# Patient Record
Sex: FEMALE | NOT HISPANIC OR LATINO | URBAN - METROPOLITAN AREA
[De-identification: names, ages, dates, MRNs, and addresses within clinical notes are randomized per-mention and may not be internally consistent; named-entity substitution may affect disease eponyms.]

---

## 2019-02-25 ENCOUNTER — IMPORTED ENCOUNTER (OUTPATIENT)
Dept: URBAN - METROPOLITAN AREA CLINIC 1 | Facility: CLINIC | Age: 64
End: 2019-02-25

## 2019-02-25 PROBLEM — H40.013: Noted: 2019-02-25

## 2019-02-25 PROBLEM — H25.813: Noted: 2019-02-25

## 2019-02-25 PROBLEM — H04.123: Noted: 2019-02-25

## 2019-02-25 PROBLEM — H40.023: Noted: 2019-02-25

## 2019-02-25 PROBLEM — R73.09: Noted: 2019-02-25

## 2019-02-25 PROCEDURE — 92250 FUNDUS PHOTOGRAPHY W/I&R: CPT

## 2019-02-25 PROCEDURE — 92004 COMPRE OPH EXAM NEW PT 1/>: CPT

## 2019-02-25 NOTE — PATIENT DISCUSSION
1.  DM Type II (Diet Controlled) without sign of diabetic retinopathy and no blot heme on dilated retinal examination today OU No Macular Edema:  Discussed the pathophysiology of diabetes and its effect on the eye and risk of blindness. Stressed the importance of strong glucose control. Advised of importance of at least yearly dilated examinations but to contact us immediately for any problems or concerns. 2. Glaucoma Suspect OU (CD:035/0.40) - IOP: 21 OU. Baseline photos show CD Asymmetry. Patient is considered Low Risk. Condition was discussed with patient and patient understands. Will continue to monitor patient for any progression in condition. Patient was advised to call us with any problems questions or concerns. 3.  Dry Eyes OU -The use/continuation of artificial tears were recommended. 4.  Cataract OU: Observe for now without intervention. The patient was advised to contact us if any change or worsening of visionReturn for an appointment in 1 month for a 10 OCT 24-2 VF with Dr. Kris Matamoros. Return for an appointment in PRN for a 40 with Dr. Kris Matamoros.

## 2019-03-20 ENCOUNTER — IMPORTED ENCOUNTER (OUTPATIENT)
Dept: URBAN - METROPOLITAN AREA CLINIC 1 | Facility: CLINIC | Age: 64
End: 2019-03-20

## 2019-03-20 PROBLEM — H52.4: Noted: 2019-03-20

## 2019-03-20 PROBLEM — H52.13: Noted: 2019-03-20

## 2019-03-20 PROCEDURE — S0621 ROUTINE OPHTHALMOLOGICAL EXA: HCPCS

## 2019-03-20 NOTE — PATIENT DISCUSSION
1. Myopia: Rx was given for correction if indicated and requested. 2. Presbyopia: Rx was given for corrective spectacles if indicated. 3.  Dry Eyes OU -The use/continuation of artificial tears were recommended. 4.  Cataract OU: Observe for now without intervention. The patient was advised to contact us if any change or worsening of vision5. Glaucoma Suspect OU (CD:035/0.40) - IOP: 21 OU. Patient returning for baseline testing. 6.  H/o DM Type II (Diet Controlled)Return for an appointment in 1 year for a 36 with Dr. Gabriela Dwyer.

## 2019-03-25 ENCOUNTER — IMPORTED ENCOUNTER (OUTPATIENT)
Dept: URBAN - METROPOLITAN AREA CLINIC 1 | Facility: CLINIC | Age: 64
End: 2019-03-25

## 2019-03-25 PROBLEM — H25.813: Noted: 2019-03-25

## 2019-03-25 PROBLEM — H04.123: Noted: 2019-03-25

## 2019-03-25 PROBLEM — H40.023: Noted: 2019-03-25

## 2019-03-25 PROCEDURE — 92012 INTRM OPH EXAM EST PATIENT: CPT

## 2019-03-25 PROCEDURE — 92133 CPTRZD OPH DX IMG PST SGM ON: CPT

## 2019-03-25 PROCEDURE — 92083 EXTENDED VISUAL FIELD XM: CPT

## 2019-03-25 NOTE — PATIENT DISCUSSION
1.  Glaucoma Suspect OU (CD: 0.35 / 0.40) -- IOP 20 / 19 today. Positive Family h/o Glaucoma. OCT today shows minimal thinning OD and WNL OS. 24-2 HVF OU today shows poor reliability but essentially normal OU. Patient is considered high risk. Condition was discussed with patient and patient understands. Will continue to monitor patient for any progression in condition. Patient was advised to call us with any problems questions or concerns. 2.  Dry Eyes OU -- Recommend the frequent use of OTC AT's BID-QID OU Routinely (Sample of Blink Given). 3.  Cataracts OU -- Observe for now without intervention. The patient was advised to contact us if any change or worsening of vision. 4. H/o DM Type II (Diet Controlled) -- H/o w/o DR / DME Ck Metzger as scheduled in March 2020 for 40 OU appointment with Dr. Bang. Return for an appointment in 1 YR for a 30 / 24-2 HVF / Raissa Boone OU with Dr. Bang.

## 2020-06-26 ENCOUNTER — IMPORTED ENCOUNTER (OUTPATIENT)
Dept: URBAN - METROPOLITAN AREA CLINIC 1 | Facility: CLINIC | Age: 65
End: 2020-06-26

## 2020-06-26 PROBLEM — H43.812: Noted: 2020-06-26

## 2020-06-26 PROBLEM — H04.123: Noted: 2020-06-26

## 2020-06-26 PROBLEM — H25.813: Noted: 2020-06-26

## 2020-06-26 PROCEDURE — 92012 INTRM OPH EXAM EST PATIENT: CPT

## 2020-06-26 NOTE — PATIENT DISCUSSION
1. 1.  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 2.  Cataract OU: Observe for now without intervention. The patient was advised to contact us if any change or worsening of vision3. Dry Eyes OU -The continuation of artificial tears ou bid were recommended. 4. H/o  Glaucoma Suspect OU (CD: 0.35 / 0.40) -- IOP 20 /20 today. Positive Family h/o Glaucoma. Patient is considered high risk. Condition was discussed with patient and patient understands. Will continue to monitor patient for any progression in condition. Patient was advised to call us with any problems questions or concerns5. H/o DM Type II (Diet Controlled) -- H/o w/o DR / DME OU6. Return for an appointment for 3  months for a 30/ OCT/ HVF 24-2 with Dr. Lavon Hernandez.

## 2020-12-04 ENCOUNTER — IMPORTED ENCOUNTER (OUTPATIENT)
Dept: URBAN - METROPOLITAN AREA CLINIC 1 | Facility: CLINIC | Age: 65
End: 2020-12-04

## 2020-12-04 PROBLEM — H52.4: Noted: 2020-12-04

## 2020-12-04 PROBLEM — H52.221: Noted: 2020-12-04

## 2020-12-04 PROBLEM — H52.13: Noted: 2020-12-04

## 2020-12-04 PROCEDURE — S0621 ROUTINE OPHTHALMOLOGICAL EXA: HCPCS

## 2020-12-04 NOTE — PATIENT DISCUSSION
1. Myopia- Rx was given for correction if indicated and requested. 2. Astigmatism OD3. Presbyopia4. Dry Eyes OU -The continuation of artificial tears ou bid were recommended. 5.  Cataract OU: Observe. 6. 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 7. H/o  Glaucoma Suspect OU (CD: 0.35 / 0.40) -- IOP 20 /20 today. Positive Family h/o Glaucoma. Patient is considered high risk. 8. H/o DM Type II (Diet Controlled) -- H/o w/o DR / DME Heide Anton for an appointment in 1yr 30/OCT with Dr. Gladis Almendarez.

## 2022-03-18 PROBLEM — M48.061 LUMBAR SPINAL STENOSIS: Status: ACTIVE | Noted: 2020-06-30

## 2022-03-19 PROBLEM — S62.521A: Status: ACTIVE | Noted: 2021-11-12

## 2022-03-19 PROBLEM — M54.16 RADICULOPATHY, LUMBAR REGION: Status: ACTIVE | Noted: 2020-04-16

## 2022-04-02 ASSESSMENT — TONOMETRY
OD_IOP_MMHG: 22
OD_IOP_MMHG: 21
OD_IOP_MMHG: 19
OS_IOP_MMHG: 19
OS_IOP_MMHG: 21
OS_IOP_MMHG: 19
OS_IOP_MMHG: 20
OD_IOP_MMHG: 20
OD_IOP_MMHG: 20
OS_IOP_MMHG: 22

## 2022-04-02 ASSESSMENT — VISUAL ACUITY
OD_SC: 20/25
OS_SC: 20/20
OS_SC: 20/20
OS_CC: J2
OD_SC: 20/25+1
OS_SC: 20/20
OS_SC: 20/25+1
OD_CC: J2
OD_SC: 20/20
OS_SC: 20/25
OD_SC: 20/20
OD_SC: 20/25

## 2023-05-21 RX ORDER — NAPROXEN 500 MG/1
TABLET ORAL DAILY
COMMUNITY

## 2023-05-21 RX ORDER — ROSUVASTATIN CALCIUM 40 MG/1
40 TABLET, COATED ORAL NIGHTLY
COMMUNITY

## 2023-05-21 RX ORDER — OMEPRAZOLE 40 MG/1
40 CAPSULE, DELAYED RELEASE ORAL DAILY
COMMUNITY

## 2023-05-21 RX ORDER — LISINOPRIL 10 MG/1
20 TABLET ORAL DAILY
COMMUNITY

## 2023-05-21 RX ORDER — ERGOCALCIFEROL 1.25 MG/1
CAPSULE ORAL
COMMUNITY

## 2023-05-21 RX ORDER — CARVEDILOL 12.5 MG/1
12.5 TABLET ORAL DAILY
COMMUNITY

## 2024-04-16 ENCOUNTER — COMPREHENSIVE EXAM (OUTPATIENT)
Dept: URBAN - METROPOLITAN AREA CLINIC 2 | Facility: CLINIC | Age: 69
End: 2024-04-16

## 2024-04-16 DIAGNOSIS — H10.45: ICD-10-CM

## 2024-04-16 DIAGNOSIS — E11.9: ICD-10-CM

## 2024-04-16 DIAGNOSIS — H43.813: ICD-10-CM

## 2024-04-16 DIAGNOSIS — H25.813: ICD-10-CM

## 2024-04-16 DIAGNOSIS — D31.31: ICD-10-CM

## 2024-04-16 DIAGNOSIS — H40.023: ICD-10-CM

## 2024-04-16 PROCEDURE — 92015 DETERMINE REFRACTIVE STATE: CPT

## 2024-04-16 PROCEDURE — 92133 CPTRZD OPH DX IMG PST SGM ON: CPT

## 2024-04-16 PROCEDURE — 92014 COMPRE OPH EXAM EST PT 1/>: CPT

## 2024-04-16 ASSESSMENT — VISUAL ACUITY
OD_CC: 20/20
OS_CC: J1
OD_CC: J1
OS_CC: 20/20

## 2024-04-16 ASSESSMENT — TONOMETRY
OD_IOP_MMHG: 16
OS_IOP_MMHG: 16